# Patient Record
Sex: MALE | Race: WHITE | URBAN - NONMETROPOLITAN AREA
[De-identification: names, ages, dates, MRNs, and addresses within clinical notes are randomized per-mention and may not be internally consistent; named-entity substitution may affect disease eponyms.]

---

## 2022-03-17 ENCOUNTER — TELEPHONE ENCOUNTER (OUTPATIENT)
Dept: URBAN - NONMETROPOLITAN AREA CLINIC 2 | Facility: CLINIC | Age: 72
End: 2022-03-17

## 2022-03-21 ENCOUNTER — TELEPHONE ENCOUNTER (OUTPATIENT)
Dept: URBAN - NONMETROPOLITAN AREA CLINIC 2 | Facility: CLINIC | Age: 72
End: 2022-03-21

## 2022-03-24 ENCOUNTER — OFFICE VISIT (OUTPATIENT)
Dept: URBAN - NONMETROPOLITAN AREA CLINIC 2 | Facility: CLINIC | Age: 72
End: 2022-03-24
Payer: MEDICARE

## 2022-03-24 ENCOUNTER — LAB OUTSIDE AN ENCOUNTER (OUTPATIENT)
Dept: URBAN - NONMETROPOLITAN AREA CLINIC 2 | Facility: CLINIC | Age: 72
End: 2022-03-24

## 2022-03-24 ENCOUNTER — WEB ENCOUNTER (OUTPATIENT)
Dept: URBAN - NONMETROPOLITAN AREA CLINIC 2 | Facility: CLINIC | Age: 72
End: 2022-03-24

## 2022-03-24 DIAGNOSIS — K80.20 GALLSTONES: ICD-10-CM

## 2022-03-24 DIAGNOSIS — K31.89 GASTRIC WALL THICKENING: ICD-10-CM

## 2022-03-24 DIAGNOSIS — C34.32 MALIGNANT NEOPLASM OF LOWER LOBE OF LEFT LUNG: ICD-10-CM

## 2022-03-24 DIAGNOSIS — Z12.11 COLON CANCER SCREENING: ICD-10-CM

## 2022-03-24 DIAGNOSIS — R10.12 LUQ ABDOMINAL PAIN: ICD-10-CM

## 2022-03-24 PROCEDURE — 99204 OFFICE O/P NEW MOD 45 MIN: CPT | Performed by: NURSE PRACTITIONER

## 2022-03-24 RX ORDER — FERROUS SULFATE 325(65) MG
1 TABLET TABLET ORAL ONCE A DAY
Status: ACTIVE | COMMUNITY

## 2022-03-24 RX ORDER — OMEPRAZOLE 40 MG/1
1 CAPSULE 30 MINUTES BEFORE MORNING MEAL CAPSULE, DELAYED RELEASE ORAL ONCE A DAY
Status: ACTIVE | COMMUNITY

## 2022-03-24 NOTE — HPI-TODAY'S VISIT:
3/24/2022 Mr. Whiteside presents for evaluation of abnormal CT scan.  He recently underwent left lobe resection by Dr. Wilson in December for lung cancer.  The surgery went well however he has had intermittent sharp left upper quadrant abdominal pain since.  He had a contrasted CT scan by his primary care physician with cholelithiasis, along with gastric wall thickening.  He is on omeprazole 40 mg daily and sulfur fate 3 times daily.  He denies any nausea vomiting or weight loss associated with this.  He has had an EGD by Dr. Copeland however this is been over 10 years ago and we do not have the records.  His bowels are moving regularly.  He denies any melena.  He has had recent labs with oncology, he is due for repeat PET scan soon.  Today he agrees to pursue EGD for further evaluation of gastric wall thickening.  MB

## 2022-04-04 ENCOUNTER — OFFICE VISIT (OUTPATIENT)
Dept: URBAN - NONMETROPOLITAN AREA SURGERY CENTER 1 | Facility: SURGERY CENTER | Age: 72
End: 2022-04-04
Payer: MEDICARE

## 2022-04-04 ENCOUNTER — CLAIMS CREATED FROM THE CLAIM WINDOW (OUTPATIENT)
Dept: URBAN - METROPOLITAN AREA CLINIC 4 | Facility: CLINIC | Age: 72
End: 2022-04-04
Payer: MEDICARE

## 2022-04-04 DIAGNOSIS — R10.13 ABDOMINAL DISCOMFORT, EPIGASTRIC: ICD-10-CM

## 2022-04-04 DIAGNOSIS — K22.70 BARRETT ESOPHAGUS: ICD-10-CM

## 2022-04-04 DIAGNOSIS — R93.3 ABN FINDINGS-GI TRACT: ICD-10-CM

## 2022-04-04 DIAGNOSIS — K31.89 FOCAL FOVEOLAR HYPERPLASIA: ICD-10-CM

## 2022-04-04 DIAGNOSIS — K22.70 BARRETT'S ESOPHAGUS WITHOUT DYSPLASIA: ICD-10-CM

## 2022-04-04 DIAGNOSIS — K31.89 ACQUIRED DEFORMITY OF DUODENUM: ICD-10-CM

## 2022-04-04 PROCEDURE — G8907 PT DOC NO EVENTS ON DISCHARG: HCPCS | Performed by: INTERNAL MEDICINE

## 2022-04-04 PROCEDURE — 88305 TISSUE EXAM BY PATHOLOGIST: CPT | Performed by: PATHOLOGY

## 2022-04-04 PROCEDURE — 88312 SPECIAL STAINS GROUP 1: CPT | Performed by: PATHOLOGY

## 2022-04-04 PROCEDURE — 43239 EGD BIOPSY SINGLE/MULTIPLE: CPT | Performed by: INTERNAL MEDICINE

## 2022-04-12 ENCOUNTER — OFFICE VISIT (OUTPATIENT)
Dept: URBAN - NONMETROPOLITAN AREA CLINIC 2 | Facility: CLINIC | Age: 72
End: 2022-04-12

## 2022-04-12 RX ORDER — FERROUS SULFATE 325(65) MG
1 TABLET TABLET ORAL ONCE A DAY
Status: ACTIVE | COMMUNITY

## 2022-04-12 RX ORDER — OMEPRAZOLE 40 MG/1
1 CAPSULE 30 MINUTES BEFORE MORNING MEAL CAPSULE, DELAYED RELEASE ORAL ONCE A DAY
Status: ACTIVE | COMMUNITY

## 2022-04-19 ENCOUNTER — LAB OUTSIDE AN ENCOUNTER (OUTPATIENT)
Dept: URBAN - NONMETROPOLITAN AREA CLINIC 2 | Facility: CLINIC | Age: 72
End: 2022-04-19

## 2022-04-19 ENCOUNTER — DASHBOARD ENCOUNTERS (OUTPATIENT)
Age: 72
End: 2022-04-19

## 2022-04-19 ENCOUNTER — OFFICE VISIT (OUTPATIENT)
Dept: URBAN - NONMETROPOLITAN AREA CLINIC 2 | Facility: CLINIC | Age: 72
End: 2022-04-19
Payer: MEDICARE

## 2022-04-19 VITALS
SYSTOLIC BLOOD PRESSURE: 148 MMHG | BODY MASS INDEX: 24.92 KG/M2 | DIASTOLIC BLOOD PRESSURE: 76 MMHG | HEIGHT: 73 IN | TEMPERATURE: 97.5 F | HEART RATE: 57 BPM | WEIGHT: 188 LBS

## 2022-04-19 DIAGNOSIS — K22.70 BARRETT'S ESOPHAGUS WITHOUT DYSPLASIA: ICD-10-CM

## 2022-04-19 DIAGNOSIS — R10.12 LUQ ABDOMINAL PAIN: ICD-10-CM

## 2022-04-19 DIAGNOSIS — K31.89 GASTRIC WALL THICKENING: ICD-10-CM

## 2022-04-19 DIAGNOSIS — Z12.11 COLON CANCER SCREENING: ICD-10-CM

## 2022-04-19 DIAGNOSIS — C34.32 MALIGNANT NEOPLASM OF LOWER LOBE OF LEFT LUNG: ICD-10-CM

## 2022-04-19 DIAGNOSIS — K80.20 GALLSTONES: ICD-10-CM

## 2022-04-19 PROBLEM — 724059003: Status: ACTIVE | Noted: 2022-03-24

## 2022-04-19 PROBLEM — 29384001: Status: ACTIVE | Noted: 2022-03-24

## 2022-04-19 PROBLEM — 235919008: Status: ACTIVE | Noted: 2022-03-24

## 2022-04-19 PROBLEM — 302914006: Status: ACTIVE | Noted: 2022-04-19

## 2022-04-19 PROBLEM — 301715003: Status: ACTIVE | Noted: 2022-03-24

## 2022-04-19 PROCEDURE — 99214 OFFICE O/P EST MOD 30 MIN: CPT | Performed by: NURSE PRACTITIONER

## 2022-04-19 RX ORDER — FERROUS SULFATE 325(65) MG
1 TABLET TABLET ORAL ONCE A DAY
Status: ACTIVE | COMMUNITY

## 2022-04-19 RX ORDER — OMEPRAZOLE 40 MG/1
1 CAPSULE 30 MINUTES BEFORE MORNING MEAL CAPSULE, DELAYED RELEASE ORAL ONCE A DAY
Status: ACTIVE | COMMUNITY

## 2022-04-19 NOTE — HPI-TODAY'S VISIT:
3/24/2022 Mr. Whiteside presents for evaluation of abnormal CT scan.  He recently underwent left lobe resection by Dr. Wilson in December for lung cancer.  The surgery went well however he has had intermittent sharp left upper quadrant abdominal pain since.  He had a contrasted CT scan by his primary care physician with cholelithiasis, along with gastric wall thickening.  He is on omeprazole 40 mg daily and sulfur fate 3 times daily.  He denies any nausea vomiting or weight loss associated with this.  He has had an EGD by Dr. Copeland however this is been over 10 years ago and we do not have the records.  His bowels are moving regularly.  He denies any melena.  He has had recent labs with oncology, he is due for repeat PET scan soon.  Today he agrees to pursue EGD for further evaluation of gastric wall thickening.  MB 4/19/2022 Mr. Whiteside presents for follow-up of endoscopy.  His EGD does reveal short segment Cobos's and mild gastritis.  He is on omeprazole 40 mg daily and Carafate 3 times daily.  The episodes of left upper quadrant discomfort are improving.  I suspect that there is a component of scar tissue from his recent left lobectomy.  He does not feel that the Carafate significantly changes his symptoms, he agrees to use this just as needed.  He agrees to continue the omeprazole 40 mg daily given his short segment Cobos's esophagus.  His last colonoscopy was done 10 years ago by Dr. Copeland, he agrees to repeat screening now.  MB

## 2022-05-05 ENCOUNTER — OFFICE VISIT (OUTPATIENT)
Dept: URBAN - NONMETROPOLITAN AREA CLINIC 2 | Facility: CLINIC | Age: 72
End: 2022-05-05

## 2022-06-25 PROBLEM — 305058001: Status: ACTIVE | Noted: 2022-03-24

## 2022-08-10 ENCOUNTER — OFFICE VISIT (OUTPATIENT)
Dept: URBAN - NONMETROPOLITAN AREA SURGERY CENTER 1 | Facility: SURGERY CENTER | Age: 72
End: 2022-08-10
Payer: MEDICARE

## 2022-08-10 ENCOUNTER — CLAIMS CREATED FROM THE CLAIM WINDOW (OUTPATIENT)
Dept: URBAN - METROPOLITAN AREA CLINIC 4 | Facility: CLINIC | Age: 72
End: 2022-08-10
Payer: MEDICARE

## 2022-08-10 DIAGNOSIS — Z12.11 COLON CANCER SCREENING: ICD-10-CM

## 2022-08-10 DIAGNOSIS — K63.89 OTHER SPECIFIED DISEASES OF INTESTINE: ICD-10-CM

## 2022-08-10 DIAGNOSIS — K63.89 BACTERIAL OVERGROWTH SYNDROME: ICD-10-CM

## 2022-08-10 PROCEDURE — 88305 TISSUE EXAM BY PATHOLOGIST: CPT | Performed by: PATHOLOGY

## 2022-08-10 PROCEDURE — 45385 COLONOSCOPY W/LESION REMOVAL: CPT | Performed by: INTERNAL MEDICINE

## 2022-08-10 PROCEDURE — G8907 PT DOC NO EVENTS ON DISCHARG: HCPCS | Performed by: INTERNAL MEDICINE

## 2022-08-10 RX ORDER — OMEPRAZOLE 40 MG/1
1 CAPSULE 30 MINUTES BEFORE MORNING MEAL CAPSULE, DELAYED RELEASE ORAL ONCE A DAY
Status: ACTIVE | COMMUNITY

## 2022-08-10 RX ORDER — FERROUS SULFATE 325(65) MG
1 TABLET TABLET ORAL ONCE A DAY
Status: ACTIVE | COMMUNITY

## 2024-11-05 ENCOUNTER — LAB OUTSIDE AN ENCOUNTER (OUTPATIENT)
Dept: URBAN - NONMETROPOLITAN AREA CLINIC 2 | Facility: CLINIC | Age: 74
End: 2024-11-05

## 2024-11-05 ENCOUNTER — OFFICE VISIT (OUTPATIENT)
Dept: URBAN - NONMETROPOLITAN AREA CLINIC 2 | Facility: CLINIC | Age: 74
End: 2024-11-05
Payer: COMMERCIAL

## 2024-11-05 VITALS
SYSTOLIC BLOOD PRESSURE: 150 MMHG | DIASTOLIC BLOOD PRESSURE: 72 MMHG | HEART RATE: 53 BPM | HEIGHT: 73 IN | WEIGHT: 182 LBS | BODY MASS INDEX: 24.12 KG/M2

## 2024-11-05 DIAGNOSIS — K31.89 GASTRIC WALL THICKENING: ICD-10-CM

## 2024-11-05 DIAGNOSIS — R10.12 LUQ ABDOMINAL PAIN: ICD-10-CM

## 2024-11-05 DIAGNOSIS — D50.9 IRON DEFICIENCY ANEMIA, UNSPECIFIED IRON DEFICIENCY ANEMIA TYPE: ICD-10-CM

## 2024-11-05 DIAGNOSIS — K76.0 FATTY LIVER: ICD-10-CM

## 2024-11-05 DIAGNOSIS — K80.20 GALLSTONES: ICD-10-CM

## 2024-11-05 DIAGNOSIS — K22.70 BARRETT'S ESOPHAGUS WITHOUT DYSPLASIA: ICD-10-CM

## 2024-11-05 DIAGNOSIS — C34.32 MALIGNANT NEOPLASM OF LOWER LOBE OF LEFT LUNG: ICD-10-CM

## 2024-11-05 DIAGNOSIS — Z12.11 COLON CANCER SCREENING: ICD-10-CM

## 2024-11-05 PROBLEM — 87522002: Status: ACTIVE | Noted: 2024-11-05

## 2024-11-05 PROCEDURE — 99214 OFFICE O/P EST MOD 30 MIN: CPT

## 2024-11-05 RX ORDER — ALBUTEROL SULFATE 2.5 MG/3ML
SOLUTION RESPIRATORY (INHALATION)
Qty: 270 MILLILITER | Status: ACTIVE | COMMUNITY

## 2024-11-05 RX ORDER — OMEPRAZOLE 40 MG/1
1 CAPSULE 30 MINUTES BEFORE MORNING MEAL CAPSULE, DELAYED RELEASE ORAL ONCE A DAY
Status: ACTIVE | COMMUNITY

## 2024-11-05 RX ORDER — SUCRALFATE 1 G/1
TABLET ORAL
Qty: 270 TABLET | Status: ACTIVE | COMMUNITY

## 2024-11-05 RX ORDER — FAMOTIDINE 40 MG/1
TABLET, FILM COATED ORAL
Qty: 30 TABLET | Status: ACTIVE | COMMUNITY

## 2024-11-05 RX ORDER — HYDROCODONE BITARTRATE AND HOMATROPINE METHYLBROMIDE 1.5; 5 MG/5ML; MG/5ML
SOLUTION ORAL
Qty: 120 MILLILITER | Status: ACTIVE | COMMUNITY

## 2024-11-05 RX ORDER — UMECLIDINIUM BROMIDE AND VILANTEROL TRIFENATATE 62.5; 25 UG/1; UG/1
POWDER RESPIRATORY (INHALATION)
Qty: 60 EACH | Status: ACTIVE | COMMUNITY

## 2024-11-05 RX ORDER — PANTOPRAZOLE SODIUM 40 MG/1
1 TABLET TABLET, DELAYED RELEASE ORAL TWICE A DAY
Qty: 180 TABLET | Refills: 3 | OUTPATIENT
Start: 2024-11-05

## 2024-11-05 RX ORDER — METFORMIN HYDROCHLORIDE 500 MG/1
TABLET ORAL
Qty: 270 TABLET | Status: ACTIVE | COMMUNITY

## 2024-11-05 RX ORDER — FLUTICASONE PROPIONATE 50 UG/1
SPRAY, METERED NASAL
Qty: 16 GRAM | Status: ACTIVE | COMMUNITY

## 2024-11-05 RX ORDER — FERROUS SULFATE 325(65) MG
1 TABLET TABLET ORAL ONCE A DAY
Status: ACTIVE | COMMUNITY

## 2024-11-05 RX ORDER — OMEGA-3-ACID ETHYL ESTERS 1 G/1
CAPSULE, LIQUID FILLED ORAL
Qty: 360 CAPSULE | Status: ACTIVE | COMMUNITY

## 2024-11-05 NOTE — HPI-TODAY'S VISIT:
3/24/2022 Mr. Whiteside presents for evaluation of abnormal CT scan.  He recently underwent left lobe resection by Dr. Wilson in December for lung cancer.  The surgery went well however he has had intermittent sharp left upper quadrant abdominal pain since.  He had a contrasted CT scan by his primary care physician with cholelithiasis, along with gastric wall thickening.  He is on omeprazole 40 mg daily and sulfur fate 3 times daily.  He denies any nausea vomiting or weight loss associated with this.  He has had an EGD by Dr. Copeland however this is been over 10 years ago and we do not have the records.  His bowels are moving regularly.  He denies any melena.  He has had recent labs with oncology, he is due for repeat PET scan soon.  Today he agrees to pursue EGD for further evaluation of gastric wall thickening.  MB 4/19/2022 Mr. Whiteside presents for follow-up of endoscopy.  His EGD does reveal short segment Cobos's and mild gastritis.  He is on omeprazole 40 mg daily and Carafate 3 times daily.  The episodes of left upper quadrant discomfort are improving.  I suspect that there is a component of scar tissue from his recent left lobectomy.  He does not feel that the Carafate significantly changes his symptoms, he agrees to use this just as needed.  He agrees to continue the omeprazole 40 mg daily given his short segment Cobos's esophagus.  His last colonoscopy was done 10 years ago by Dr. Copeland, he agrees to repeat screening now.  MB 11/5/2024 Mr. Whiteside returns to clinic, recommended to return by Dr. Ha who noted recurrence of iron deficiency anemia not improving on oral iron supplementation.  He is now undergoing iron transfusions.  Hemoglobin initially 12.5 in July dropped to 7.9 in October.  Patient denies any observance of melena but does observe an outside layer of darkness to his brown stool.  Darkness of stool improved after discontinuing daily tomato juice beverage.  He denies any significant events of abdominal pain, wakes up in the morning with some mild central abdominal discomfort which improves quickly.  He denies dysphagia, unintentional weight loss, blood in his stool or change in bowel habits. He continues omeprazole daily, started on famotidine 40 mg in the evening for chronic cough during a visit with his pulmonologist.  He completed imaging of his chest with [facility] [place] [facility] that noted fatty liver, patient states he has a long history of fatty liver.  He did have recent blood work done at their office and we will request that. Today discussed having him scheduled for repeat endoscopic evaluation given his HONG.  Previously he was noted to have Cobos's on EGD in 2022 and is due to repeat for surveillance. Denies use of NSAIDs. SP

## 2024-11-21 ENCOUNTER — CLAIMS CREATED FROM THE CLAIM WINDOW (OUTPATIENT)
Dept: URBAN - METROPOLITAN AREA CLINIC 4 | Facility: CLINIC | Age: 74
End: 2024-11-21
Payer: COMMERCIAL

## 2024-11-21 ENCOUNTER — CLAIMS CREATED FROM THE CLAIM WINDOW (OUTPATIENT)
Dept: URBAN - NONMETROPOLITAN AREA SURGERY CENTER 1 | Facility: SURGERY CENTER | Age: 74
End: 2024-11-21

## 2024-11-21 DIAGNOSIS — K20.0 EOSINOPHILIC ESOPHAGITIS: ICD-10-CM

## 2024-11-21 DIAGNOSIS — K29.70 GASTRITIS, UNSPECIFIED, WITHOUT BLEEDING: ICD-10-CM

## 2024-11-21 DIAGNOSIS — K31.89 OTHER DISEASES OF STOMACH AND DUODENUM: ICD-10-CM

## 2024-11-21 PROCEDURE — 88305 TISSUE EXAM BY PATHOLOGIST: CPT | Performed by: PATHOLOGY

## 2024-11-21 RX ORDER — UMECLIDINIUM BROMIDE AND VILANTEROL TRIFENATATE 62.5; 25 UG/1; UG/1
POWDER RESPIRATORY (INHALATION)
Qty: 60 EACH | Status: ACTIVE | COMMUNITY

## 2024-11-21 RX ORDER — PANTOPRAZOLE SODIUM 40 MG/1
1 TABLET TABLET, DELAYED RELEASE ORAL TWICE A DAY
Qty: 180 TABLET | Refills: 3 | Status: ACTIVE | COMMUNITY
Start: 2024-11-05

## 2024-11-21 RX ORDER — HYDROCODONE BITARTRATE AND HOMATROPINE METHYLBROMIDE 1.5; 5 MG/5ML; MG/5ML
SOLUTION ORAL
Qty: 120 MILLILITER | Status: ACTIVE | COMMUNITY

## 2024-11-21 RX ORDER — FERROUS SULFATE 325(65) MG
1 TABLET TABLET ORAL ONCE A DAY
Status: ACTIVE | COMMUNITY

## 2024-11-21 RX ORDER — OMEPRAZOLE 40 MG/1
1 CAPSULE 30 MINUTES BEFORE MORNING MEAL CAPSULE, DELAYED RELEASE ORAL ONCE A DAY
Status: ACTIVE | COMMUNITY

## 2024-11-21 RX ORDER — FLUTICASONE PROPIONATE 50 UG/1
SPRAY, METERED NASAL
Qty: 16 GRAM | Status: ACTIVE | COMMUNITY

## 2024-11-21 RX ORDER — FAMOTIDINE 40 MG/1
TABLET, FILM COATED ORAL
Qty: 30 TABLET | Status: ACTIVE | COMMUNITY

## 2024-11-21 RX ORDER — OMEGA-3-ACID ETHYL ESTERS 1 G/1
CAPSULE, LIQUID FILLED ORAL
Qty: 360 CAPSULE | Status: ACTIVE | COMMUNITY

## 2024-11-21 RX ORDER — ALBUTEROL SULFATE 2.5 MG/3ML
SOLUTION RESPIRATORY (INHALATION)
Qty: 270 MILLILITER | Status: ACTIVE | COMMUNITY

## 2024-11-21 RX ORDER — SUCRALFATE 1 G/1
TABLET ORAL
Qty: 270 TABLET | Status: ACTIVE | COMMUNITY

## 2024-11-21 RX ORDER — METFORMIN HYDROCHLORIDE 500 MG/1
TABLET ORAL
Qty: 270 TABLET | Status: ACTIVE | COMMUNITY

## 2024-12-11 ENCOUNTER — TELEPHONE ENCOUNTER (OUTPATIENT)
Dept: URBAN - NONMETROPOLITAN AREA CLINIC 2 | Facility: CLINIC | Age: 74
End: 2024-12-11

## 2024-12-16 ENCOUNTER — LAB OUTSIDE AN ENCOUNTER (OUTPATIENT)
Dept: URBAN - NONMETROPOLITAN AREA CLINIC 2 | Facility: CLINIC | Age: 74
End: 2024-12-16

## 2024-12-20 ENCOUNTER — TELEPHONE ENCOUNTER (OUTPATIENT)
Dept: URBAN - NONMETROPOLITAN AREA CLINIC 2 | Facility: CLINIC | Age: 74
End: 2024-12-20

## 2024-12-30 ENCOUNTER — LAB OUTSIDE AN ENCOUNTER (OUTPATIENT)
Dept: URBAN - NONMETROPOLITAN AREA CLINIC 2 | Facility: CLINIC | Age: 74
End: 2024-12-30

## 2025-01-06 ENCOUNTER — TELEPHONE ENCOUNTER (OUTPATIENT)
Dept: URBAN - NONMETROPOLITAN AREA CLINIC 2 | Facility: CLINIC | Age: 75
End: 2025-01-06

## 2025-01-16 ENCOUNTER — OFFICE VISIT (OUTPATIENT)
Dept: URBAN - NONMETROPOLITAN AREA CLINIC 2 | Facility: CLINIC | Age: 75
End: 2025-01-16
Payer: COMMERCIAL

## 2025-01-16 VITALS
SYSTOLIC BLOOD PRESSURE: 147 MMHG | HEIGHT: 73 IN | WEIGHT: 187 LBS | HEART RATE: 54 BPM | DIASTOLIC BLOOD PRESSURE: 81 MMHG | BODY MASS INDEX: 24.78 KG/M2

## 2025-01-16 DIAGNOSIS — Z12.11 COLON CANCER SCREENING: ICD-10-CM

## 2025-01-16 DIAGNOSIS — K22.70 BARRETT'S ESOPHAGUS WITHOUT DYSPLASIA: ICD-10-CM

## 2025-01-16 DIAGNOSIS — K80.20 GALLSTONES: ICD-10-CM

## 2025-01-16 DIAGNOSIS — R10.12 LUQ ABDOMINAL PAIN: ICD-10-CM

## 2025-01-16 DIAGNOSIS — C34.32 MALIGNANT NEOPLASM OF LOWER LOBE OF LEFT LUNG: ICD-10-CM

## 2025-01-16 DIAGNOSIS — R05.3 CHRONIC COUGH: ICD-10-CM

## 2025-01-16 DIAGNOSIS — D50.9 IRON DEFICIENCY ANEMIA, UNSPECIFIED IRON DEFICIENCY ANEMIA TYPE: ICD-10-CM

## 2025-01-16 DIAGNOSIS — K76.0 FATTY LIVER: ICD-10-CM

## 2025-01-16 DIAGNOSIS — K31.89 GASTRIC WALL THICKENING: ICD-10-CM

## 2025-01-16 PROBLEM — 68154008: Status: ACTIVE | Noted: 2025-01-16

## 2025-01-16 PROCEDURE — 99214 OFFICE O/P EST MOD 30 MIN: CPT

## 2025-01-16 RX ORDER — FAMOTIDINE 40 MG/1
TABLET, FILM COATED ORAL
Qty: 30 TABLET | Status: ACTIVE | COMMUNITY

## 2025-01-16 RX ORDER — NITROGLYCERIN 0.4 MG/1
1 TABLET UNDER THE TONGUE AND ALLOW TO DISSOLVE AS NEEDED. TAKE EVERY 5 MINUTES UP TO 3 TIMES IF CHEST PAIN PERSISTS TABLET SUBLINGUAL THREE TIMES A DAY
Status: ACTIVE | COMMUNITY

## 2025-01-16 RX ORDER — OMEGA-3-ACID ETHYL ESTERS 1 G/1
CAPSULE, LIQUID FILLED ORAL
Qty: 360 CAPSULE | Status: ACTIVE | COMMUNITY

## 2025-01-16 RX ORDER — PANTOPRAZOLE SODIUM 40 MG/1
1 TABLET TABLET, DELAYED RELEASE ORAL TWICE A DAY
Qty: 180 TABLET | Refills: 3 | OUTPATIENT

## 2025-01-16 RX ORDER — OMEPRAZOLE 40 MG/1
1 CAPSULE 30 MINUTES BEFORE MORNING MEAL CAPSULE, DELAYED RELEASE ORAL ONCE A DAY
Status: ACTIVE | COMMUNITY

## 2025-01-16 RX ORDER — FLUTICASONE PROPIONATE 50 UG/1
SPRAY, METERED NASAL
Qty: 16 GRAM | Status: ACTIVE | COMMUNITY

## 2025-01-16 RX ORDER — SUCRALFATE 1 G/1
TABLET ORAL
Qty: 270 TABLET | Status: ACTIVE | COMMUNITY

## 2025-01-16 RX ORDER — METFORMIN HYDROCHLORIDE 500 MG/1
TABLET ORAL
Qty: 270 TABLET | Status: ACTIVE | COMMUNITY

## 2025-01-16 RX ORDER — PANTOPRAZOLE SODIUM 40 MG/1
1 TABLET TABLET, DELAYED RELEASE ORAL TWICE A DAY
Qty: 180 TABLET | Refills: 3 | Status: ACTIVE | COMMUNITY
Start: 2024-11-05

## 2025-01-16 RX ORDER — FERROUS SULFATE 325(65) MG
1 TABLET TABLET ORAL ONCE A DAY
Status: ACTIVE | COMMUNITY

## 2025-01-16 RX ORDER — ALBUTEROL SULFATE 2.5 MG/3ML
SOLUTION RESPIRATORY (INHALATION)
Qty: 270 MILLILITER | Status: ACTIVE | COMMUNITY

## 2025-01-16 RX ORDER — UMECLIDINIUM BROMIDE AND VILANTEROL TRIFENATATE 62.5; 25 UG/1; UG/1
POWDER RESPIRATORY (INHALATION)
Qty: 60 EACH | Status: ACTIVE | COMMUNITY

## 2025-01-16 RX ORDER — HYDROCODONE BITARTRATE AND HOMATROPINE METHYLBROMIDE 1.5; 5 MG/5ML; MG/5ML
SOLUTION ORAL
Qty: 120 MILLILITER | Status: ACTIVE | COMMUNITY

## 2025-01-16 NOTE — HPI-TODAY'S VISIT:
3/24/2022 Mr. Whiteside presents for evaluation of abnormal CT scan.  He recently underwent left lobe resection by Dr. Wilson in December for lung cancer.  The surgery went well however he has had intermittent sharp left upper quadrant abdominal pain since.  He had a contrasted CT scan by his primary care physician with cholelithiasis, along with gastric wall thickening.  He is on omeprazole 40 mg daily and sulfur fate 3 times daily.  He denies any nausea vomiting or weight loss associated with this.  He has had an EGD by Dr. Copeland however this is been over 10 years ago and we do not have the records.  His bowels are moving regularly.  He denies any melena.  He has had recent labs with oncology, he is due for repeat PET scan soon.  Today he agrees to pursue EGD for further evaluation of gastric wall thickening.  MB 4/19/2022 Mr. Whiteside presents for follow-up of endoscopy.  His EGD does reveal short segment Cobos's and mild gastritis.  He is on omeprazole 40 mg daily and Carafate 3 times daily.  The episodes of left upper quadrant discomfort are improving.  I suspect that there is a component of scar tissue from his recent left lobectomy.  He does not feel that the Carafate significantly changes his symptoms, he agrees to use this just as needed.  He agrees to continue the omeprazole 40 mg daily given his short segment Cobos's esophagus.  His last colonoscopy was done 10 years ago by Dr. Copeland, he agrees to repeat screening now.  MB 11/5/2024 Mr. Whiteside returns to clinic, recommended to return by Dr. Ha who noted recurrence of iron deficiency anemia not improving on oral iron supplementation.  He is now undergoing iron transfusions.  Hemoglobin initially 12.5 in July dropped to 7.9 in October.  Patient denies any observance of melena but does observe an outside layer of darkness to his brown stool.  Darkness of stool improved after discontinuing daily tomato juice beverage.  He denies any significant events of abdominal pain, wakes up in the morning with some mild central abdominal discomfort which improves quickly.  He denies dysphagia, unintentional weight loss, blood in his stool or change in bowel habits. He continues omeprazole daily, started on famotidine 40 mg in the evening for chronic cough during a visit with his pulmonologist.  He completed imaging of his chest with [facility] that noted fatty liver, patient states he has a long history of fatty liver.  He did have recent blood work done at their office and we will request that. Today discussed having him scheduled for repeat endoscopic evaluation given his HONG.  Previously he was noted to have Cobos's on EGD in 2022 and is due to repeat for surveillance. Denies use of NSAIDs. SP  EGD 11/21/24 Findings: . The Z-line was irregular and was found at the gastroesophageal junction. Biopsies were taken with a cold forceps for histology. . Esophagogastric landmarks were identified: the gastroesophageal junction was found at 40 cm and the Z-line was found at 40 cm from the incisors. . Diffuse mild inflammation characterized by erythema was found in the gastric antrum. Biopsies were taken with a cold forceps for histology. . A few small angioectasias without bleeding were found in the second portion of the duodenum. . The examined duodenum was normal. Biopsies were taken with a cold forceps for histology.  1/16/2025 Mr. Whiteside returns to clinic for follow-up.  During his workup for iron deficiency anemia he completed EGD/COLON and CTE.  This  CTE did not show any concerns for anemia and did not show any stenosis or obstruction.  He was noted to have non-bleeding duodenal angioectasias. He will obtain a fit test today.  His hemoglobin has been stable with repeat labs.  11.9 on 1/2/2025.  He is not seeing any dark stool or blood in stool. Today he asks about his chronic cough and any relation to his reflux.  He is on maximum PPI.  He has not seen Dr. Vera well in some time and this was prior to his developed cough. He is waking up in the middle of the night coughing.  He is weighing but not daily with no increase in his weight. He sees Dr. Dowell as well for cough.  He has coarse lung sounds today.  Today we discussed not changing his acid reduction medication refer he has evaluation with cardiology and pulmonology first.  He will return in 6 months pending that workup we will consider switching to lansoprazole 30 mg twice daily. Today he has no dysphagia, abdominal pain.  He is not having nausea vomiting reflux or heartburn.  SP

## 2025-01-17 ENCOUNTER — WEB ENCOUNTER (OUTPATIENT)
Dept: URBAN - NONMETROPOLITAN AREA CLINIC 2 | Facility: CLINIC | Age: 75
End: 2025-01-17

## 2025-01-29 LAB — OCCULT BLOOD, FECAL, IA: (no result)

## 2025-02-10 ENCOUNTER — TELEPHONE ENCOUNTER (OUTPATIENT)
Dept: URBAN - NONMETROPOLITAN AREA CLINIC 2 | Facility: CLINIC | Age: 75
End: 2025-02-10

## 2025-02-24 ENCOUNTER — LAB OUTSIDE AN ENCOUNTER (OUTPATIENT)
Dept: URBAN - NONMETROPOLITAN AREA CLINIC 2 | Facility: CLINIC | Age: 75
End: 2025-02-24

## 2025-03-11 ENCOUNTER — TELEPHONE ENCOUNTER (OUTPATIENT)
Dept: URBAN - NONMETROPOLITAN AREA CLINIC 2 | Facility: CLINIC | Age: 75
End: 2025-03-11

## 2025-03-31 ENCOUNTER — OFFICE VISIT (OUTPATIENT)
Dept: URBAN - NONMETROPOLITAN AREA CLINIC 1 | Facility: CLINIC | Age: 75
End: 2025-03-31

## 2025-04-14 ENCOUNTER — TELEPHONE ENCOUNTER (OUTPATIENT)
Dept: URBAN - NONMETROPOLITAN AREA CLINIC 2 | Facility: CLINIC | Age: 75
End: 2025-04-14

## 2025-04-28 ENCOUNTER — TELEPHONE ENCOUNTER (OUTPATIENT)
Dept: URBAN - NONMETROPOLITAN AREA CLINIC 2 | Facility: CLINIC | Age: 75
End: 2025-04-28

## 2025-04-28 ENCOUNTER — WEB ENCOUNTER (OUTPATIENT)
Dept: URBAN - NONMETROPOLITAN AREA CLINIC 2 | Facility: CLINIC | Age: 75
End: 2025-04-28

## 2025-07-24 ENCOUNTER — OFFICE VISIT (OUTPATIENT)
Dept: URBAN - NONMETROPOLITAN AREA CLINIC 2 | Facility: CLINIC | Age: 75
End: 2025-07-24